# Patient Record
Sex: MALE | Race: WHITE | NOT HISPANIC OR LATINO | Employment: FULL TIME | URBAN - METROPOLITAN AREA
[De-identification: names, ages, dates, MRNs, and addresses within clinical notes are randomized per-mention and may not be internally consistent; named-entity substitution may affect disease eponyms.]

---

## 2019-09-01 ENCOUNTER — OFFICE VISIT (OUTPATIENT)
Dept: URGENT CARE | Facility: CLINIC | Age: 59
End: 2019-09-01
Payer: COMMERCIAL

## 2019-09-01 ENCOUNTER — HOSPITAL ENCOUNTER (INPATIENT)
Facility: MEDICAL CENTER | Age: 59
LOS: 2 days | DRG: 419 | End: 2019-09-03
Attending: EMERGENCY MEDICINE | Admitting: SURGERY
Payer: COMMERCIAL

## 2019-09-01 ENCOUNTER — ANESTHESIA (OUTPATIENT)
Dept: SURGERY | Facility: MEDICAL CENTER | Age: 59
DRG: 419 | End: 2019-09-01
Payer: COMMERCIAL

## 2019-09-01 ENCOUNTER — APPOINTMENT (OUTPATIENT)
Dept: RADIOLOGY | Facility: MEDICAL CENTER | Age: 59
DRG: 419 | End: 2019-09-01
Attending: EMERGENCY MEDICINE
Payer: COMMERCIAL

## 2019-09-01 ENCOUNTER — ANESTHESIA EVENT (OUTPATIENT)
Dept: SURGERY | Facility: MEDICAL CENTER | Age: 59
DRG: 419 | End: 2019-09-01
Payer: COMMERCIAL

## 2019-09-01 VITALS
SYSTOLIC BLOOD PRESSURE: 146 MMHG | BODY MASS INDEX: 37.11 KG/M2 | RESPIRATION RATE: 16 BRPM | HEIGHT: 73 IN | TEMPERATURE: 97.6 F | DIASTOLIC BLOOD PRESSURE: 84 MMHG | OXYGEN SATURATION: 99 % | WEIGHT: 280 LBS | HEART RATE: 74 BPM

## 2019-09-01 DIAGNOSIS — K81.9 CHOLECYSTITIS: ICD-10-CM

## 2019-09-01 DIAGNOSIS — R11.10 VOMITING, INTRACTABILITY OF VOMITING NOT SPECIFIED, PRESENCE OF NAUSEA NOT SPECIFIED, UNSPECIFIED VOMITING TYPE: ICD-10-CM

## 2019-09-01 LAB
ALBUMIN SERPL BCP-MCNC: 4.7 G/DL (ref 3.2–4.9)
ALBUMIN/GLOB SERPL: 1.5 G/DL
ALP SERPL-CCNC: 64 U/L (ref 30–99)
ALT SERPL-CCNC: 12 U/L (ref 2–50)
ANION GAP SERPL CALC-SCNC: 11 MMOL/L (ref 0–11.9)
APPEARANCE UR: CLEAR
AST SERPL-CCNC: 13 U/L (ref 12–45)
BACTERIA #/AREA URNS HPF: NEGATIVE /HPF
BASOPHILS # BLD AUTO: 0.2 % (ref 0–1.8)
BASOPHILS # BLD: 0.03 K/UL (ref 0–0.12)
BILIRUB SERPL-MCNC: 1.1 MG/DL (ref 0.1–1.5)
BILIRUB UR QL STRIP.AUTO: NEGATIVE
BUN SERPL-MCNC: 12 MG/DL (ref 8–22)
CALCIUM SERPL-MCNC: 9.5 MG/DL (ref 8.5–10.5)
CHLORIDE SERPL-SCNC: 103 MMOL/L (ref 96–112)
CO2 SERPL-SCNC: 25 MMOL/L (ref 20–33)
COLOR UR: YELLOW
CREAT SERPL-MCNC: 1.14 MG/DL (ref 0.5–1.4)
EOSINOPHIL # BLD AUTO: 0 K/UL (ref 0–0.51)
EOSINOPHIL NFR BLD: 0 % (ref 0–6.9)
EPI CELLS #/AREA URNS HPF: NEGATIVE /HPF
ERYTHROCYTE [DISTWIDTH] IN BLOOD BY AUTOMATED COUNT: 44.4 FL (ref 35.9–50)
GLOBULIN SER CALC-MCNC: 3.1 G/DL (ref 1.9–3.5)
GLUCOSE SERPL-MCNC: 144 MG/DL (ref 65–99)
GLUCOSE UR STRIP.AUTO-MCNC: NEGATIVE MG/DL
HCT VFR BLD AUTO: 47.8 % (ref 42–52)
HGB BLD-MCNC: 15.9 G/DL (ref 14–18)
HYALINE CASTS #/AREA URNS LPF: ABNORMAL /LPF
IMM GRANULOCYTES # BLD AUTO: 0.06 K/UL (ref 0–0.11)
IMM GRANULOCYTES NFR BLD AUTO: 0.4 % (ref 0–0.9)
KETONES UR STRIP.AUTO-MCNC: 15 MG/DL
LEUKOCYTE ESTERASE UR QL STRIP.AUTO: NEGATIVE
LIPASE SERPL-CCNC: 6 U/L (ref 11–82)
LYMPHOCYTES # BLD AUTO: 0.76 K/UL (ref 1–4.8)
LYMPHOCYTES NFR BLD: 5.6 % (ref 22–41)
MCH RBC QN AUTO: 32.5 PG (ref 27–33)
MCHC RBC AUTO-ENTMCNC: 33.3 G/DL (ref 33.7–35.3)
MCV RBC AUTO: 97.8 FL (ref 81.4–97.8)
MICRO URNS: ABNORMAL
MONOCYTES # BLD AUTO: 1.04 K/UL (ref 0–0.85)
MONOCYTES NFR BLD AUTO: 7.7 % (ref 0–13.4)
MUCOUS THREADS #/AREA URNS HPF: ABNORMAL /HPF
NEUTROPHILS # BLD AUTO: 11.58 K/UL (ref 1.82–7.42)
NEUTROPHILS NFR BLD: 86.1 % (ref 44–72)
NITRITE UR QL STRIP.AUTO: NEGATIVE
NRBC # BLD AUTO: 0 K/UL
NRBC BLD-RTO: 0 /100 WBC
PH UR STRIP.AUTO: 5.5 [PH] (ref 5–8)
PLATELET # BLD AUTO: 133 K/UL (ref 164–446)
PMV BLD AUTO: 10.7 FL (ref 9–12.9)
POTASSIUM SERPL-SCNC: 3.9 MMOL/L (ref 3.6–5.5)
PROT SERPL-MCNC: 7.8 G/DL (ref 6–8.2)
PROT UR QL STRIP: 30 MG/DL
RBC # BLD AUTO: 4.89 M/UL (ref 4.7–6.1)
RBC # URNS HPF: ABNORMAL /HPF
RBC UR QL AUTO: ABNORMAL
SODIUM SERPL-SCNC: 139 MMOL/L (ref 135–145)
SP GR UR STRIP.AUTO: >=1.045
UROBILINOGEN UR STRIP.AUTO-MCNC: 1 MG/DL
WBC # BLD AUTO: 13.5 K/UL (ref 4.8–10.8)
WBC #/AREA URNS HPF: ABNORMAL /HPF

## 2019-09-01 PROCEDURE — A6402 STERILE GAUZE <= 16 SQ IN: HCPCS | Performed by: SURGERY

## 2019-09-01 PROCEDURE — 700111 HCHG RX REV CODE 636 W/ 250 OVERRIDE (IP): Performed by: SURGERY

## 2019-09-01 PROCEDURE — 160002 HCHG RECOVERY MINUTES (STAT): Performed by: SURGERY

## 2019-09-01 PROCEDURE — 501570 HCHG TROCAR, SEPARATOR: Performed by: SURGERY

## 2019-09-01 PROCEDURE — 501577 HCHG TROCAR, STEP 11MM: Performed by: SURGERY

## 2019-09-01 PROCEDURE — 96376 TX/PRO/DX INJ SAME DRUG ADON: CPT

## 2019-09-01 PROCEDURE — A9270 NON-COVERED ITEM OR SERVICE: HCPCS | Performed by: SURGERY

## 2019-09-01 PROCEDURE — 36415 COLL VENOUS BLD VENIPUNCTURE: CPT

## 2019-09-01 PROCEDURE — 700102 HCHG RX REV CODE 250 W/ 637 OVERRIDE(OP): Performed by: SURGERY

## 2019-09-01 PROCEDURE — 83690 ASSAY OF LIPASE: CPT

## 2019-09-01 PROCEDURE — 700111 HCHG RX REV CODE 636 W/ 250 OVERRIDE (IP): Performed by: EMERGENCY MEDICINE

## 2019-09-01 PROCEDURE — 700112 HCHG RX REV CODE 229: Performed by: SURGERY

## 2019-09-01 PROCEDURE — 160036 HCHG PACU - EA ADDL 30 MINS PHASE I: Performed by: SURGERY

## 2019-09-01 PROCEDURE — A9270 NON-COVERED ITEM OR SERVICE: HCPCS | Performed by: ANESTHESIOLOGY

## 2019-09-01 PROCEDURE — 501568 HCHG TROCAR, BLUNTPORT 12MM: Performed by: SURGERY

## 2019-09-01 PROCEDURE — 501838 HCHG SUTURE GENERAL: Performed by: SURGERY

## 2019-09-01 PROCEDURE — 160039 HCHG SURGERY MINUTES - EA ADDL 1 MIN LEVEL 3: Performed by: SURGERY

## 2019-09-01 PROCEDURE — 160048 HCHG OR STATISTICAL LEVEL 1-5: Performed by: SURGERY

## 2019-09-01 PROCEDURE — 99285 EMERGENCY DEPT VISIT HI MDM: CPT

## 2019-09-01 PROCEDURE — 700105 HCHG RX REV CODE 258: Performed by: SURGERY

## 2019-09-01 PROCEDURE — 700117 HCHG RX CONTRAST REV CODE 255: Performed by: EMERGENCY MEDICINE

## 2019-09-01 PROCEDURE — 500445 HCHG HEMOSTAT, SURGICEL 4X8: Performed by: SURGERY

## 2019-09-01 PROCEDURE — 700105 HCHG RX REV CODE 258: Performed by: EMERGENCY MEDICINE

## 2019-09-01 PROCEDURE — 96375 TX/PRO/DX INJ NEW DRUG ADDON: CPT

## 2019-09-01 PROCEDURE — 770006 HCHG ROOM/CARE - MED/SURG/GYN SEMI*

## 2019-09-01 PROCEDURE — 160009 HCHG ANES TIME/MIN: Performed by: SURGERY

## 2019-09-01 PROCEDURE — 700102 HCHG RX REV CODE 250 W/ 637 OVERRIDE(OP): Performed by: ANESTHESIOLOGY

## 2019-09-01 PROCEDURE — 81001 URINALYSIS AUTO W/SCOPE: CPT

## 2019-09-01 PROCEDURE — 700101 HCHG RX REV CODE 250: Performed by: ANESTHESIOLOGY

## 2019-09-01 PROCEDURE — 700105 HCHG RX REV CODE 258: Performed by: ANESTHESIOLOGY

## 2019-09-01 PROCEDURE — 0FT44ZZ RESECTION OF GALLBLADDER, PERCUTANEOUS ENDOSCOPIC APPROACH: ICD-10-PCS | Performed by: SURGERY

## 2019-09-01 PROCEDURE — 160028 HCHG SURGERY MINUTES - 1ST 30 MINS LEVEL 3: Performed by: SURGERY

## 2019-09-01 PROCEDURE — 700111 HCHG RX REV CODE 636 W/ 250 OVERRIDE (IP): Performed by: ANESTHESIOLOGY

## 2019-09-01 PROCEDURE — 501583 HCHG TROCAR, THRD CAN&SEAL 5X100: Performed by: SURGERY

## 2019-09-01 PROCEDURE — 88304 TISSUE EXAM BY PATHOLOGIST: CPT

## 2019-09-01 PROCEDURE — 502648 HCHG APPLICATOR, EVICEL: Performed by: SURGERY

## 2019-09-01 PROCEDURE — 500389 HCHG DRAIN, RESERVOIR SUCT JP 100CC: Performed by: SURGERY

## 2019-09-01 PROCEDURE — 74177 CT ABD & PELVIS W/CONTRAST: CPT

## 2019-09-01 PROCEDURE — 160035 HCHG PACU - 1ST 60 MINS PHASE I: Performed by: SURGERY

## 2019-09-01 PROCEDURE — 501399 HCHG SPECIMAN BAG, ENDO CATC: Performed by: SURGERY

## 2019-09-01 PROCEDURE — 85025 COMPLETE CBC W/AUTO DIFF WBC: CPT

## 2019-09-01 PROCEDURE — 700101 HCHG RX REV CODE 250: Performed by: SURGERY

## 2019-09-01 PROCEDURE — 80053 COMPREHEN METABOLIC PANEL: CPT

## 2019-09-01 PROCEDURE — 502571 HCHG PACK, LAP CHOLE: Performed by: SURGERY

## 2019-09-01 PROCEDURE — 96365 THER/PROPH/DIAG IV INF INIT: CPT

## 2019-09-01 RX ORDER — AMOXICILLIN 250 MG
1 CAPSULE ORAL NIGHTLY
Status: DISCONTINUED | OUTPATIENT
Start: 2019-09-01 | End: 2019-09-03 | Stop reason: HOSPADM

## 2019-09-01 RX ORDER — DIPHENHYDRAMINE HYDROCHLORIDE 50 MG/ML
12.5 INJECTION INTRAMUSCULAR; INTRAVENOUS
Status: DISCONTINUED | OUTPATIENT
Start: 2019-09-01 | End: 2019-09-01 | Stop reason: HOSPADM

## 2019-09-01 RX ORDER — MEPERIDINE HYDROCHLORIDE 25 MG/ML
12.5 INJECTION INTRAMUSCULAR; INTRAVENOUS; SUBCUTANEOUS
Status: DISCONTINUED | OUTPATIENT
Start: 2019-09-01 | End: 2019-09-01 | Stop reason: HOSPADM

## 2019-09-01 RX ORDER — ACETAMINOPHEN 650 MG/1
650 SUPPOSITORY RECTAL EVERY 4 HOURS PRN
Status: DISCONTINUED | OUTPATIENT
Start: 2019-09-01 | End: 2019-09-03 | Stop reason: HOSPADM

## 2019-09-01 RX ORDER — CEFOTETAN DISODIUM 2 G/20ML
INJECTION, POWDER, FOR SOLUTION INTRAMUSCULAR; INTRAVENOUS PRN
Status: DISCONTINUED | OUTPATIENT
Start: 2019-09-01 | End: 2019-09-01 | Stop reason: SURG

## 2019-09-01 RX ORDER — MORPHINE SULFATE 10 MG/ML
6 INJECTION, SOLUTION INTRAMUSCULAR; INTRAVENOUS
Status: DISCONTINUED | OUTPATIENT
Start: 2019-09-01 | End: 2019-09-03 | Stop reason: HOSPADM

## 2019-09-01 RX ORDER — AMOXICILLIN 250 MG
1 CAPSULE ORAL
Status: DISCONTINUED | OUTPATIENT
Start: 2019-09-01 | End: 2019-09-03 | Stop reason: HOSPADM

## 2019-09-01 RX ORDER — BISACODYL 10 MG
10 SUPPOSITORY, RECTAL RECTAL
Status: DISCONTINUED | OUTPATIENT
Start: 2019-09-01 | End: 2019-09-03 | Stop reason: HOSPADM

## 2019-09-01 RX ORDER — HYDROMORPHONE HYDROCHLORIDE 1 MG/ML
0.2 INJECTION, SOLUTION INTRAMUSCULAR; INTRAVENOUS; SUBCUTANEOUS
Status: DISCONTINUED | OUTPATIENT
Start: 2019-09-01 | End: 2019-09-01 | Stop reason: HOSPADM

## 2019-09-01 RX ORDER — MORPHINE SULFATE 4 MG/ML
2 INJECTION, SOLUTION INTRAMUSCULAR; INTRAVENOUS
Status: DISCONTINUED | OUTPATIENT
Start: 2019-09-01 | End: 2019-09-03 | Stop reason: HOSPADM

## 2019-09-01 RX ORDER — ENEMA 19; 7 G/133ML; G/133ML
1 ENEMA RECTAL
Status: DISCONTINUED | OUTPATIENT
Start: 2019-09-01 | End: 2019-09-03 | Stop reason: HOSPADM

## 2019-09-01 RX ORDER — CELECOXIB 200 MG/1
200 CAPSULE ORAL ONCE
Status: COMPLETED | OUTPATIENT
Start: 2019-09-01 | End: 2019-09-01

## 2019-09-01 RX ORDER — SODIUM CHLORIDE 9 MG/ML
1000 INJECTION, SOLUTION INTRAVENOUS ONCE
Status: COMPLETED | OUTPATIENT
Start: 2019-09-01 | End: 2019-09-01

## 2019-09-01 RX ORDER — HYDROMORPHONE HYDROCHLORIDE 1 MG/ML
0.4 INJECTION, SOLUTION INTRAMUSCULAR; INTRAVENOUS; SUBCUTANEOUS
Status: DISCONTINUED | OUTPATIENT
Start: 2019-09-01 | End: 2019-09-01 | Stop reason: HOSPADM

## 2019-09-01 RX ORDER — GABAPENTIN 300 MG/1
300 CAPSULE ORAL ONCE
Status: COMPLETED | OUTPATIENT
Start: 2019-09-01 | End: 2019-09-01

## 2019-09-01 RX ORDER — OXYCODONE HYDROCHLORIDE 5 MG/1
5 TABLET ORAL
Status: DISCONTINUED | OUTPATIENT
Start: 2019-09-01 | End: 2019-09-03 | Stop reason: HOSPADM

## 2019-09-01 RX ORDER — SODIUM CHLORIDE, SODIUM LACTATE, POTASSIUM CHLORIDE, CALCIUM CHLORIDE 600; 310; 30; 20 MG/100ML; MG/100ML; MG/100ML; MG/100ML
INJECTION, SOLUTION INTRAVENOUS CONTINUOUS
Status: DISCONTINUED | OUTPATIENT
Start: 2019-09-01 | End: 2019-09-03 | Stop reason: HOSPADM

## 2019-09-01 RX ORDER — POLYETHYLENE GLYCOL 3350 17 G/17G
1 POWDER, FOR SOLUTION ORAL 2 TIMES DAILY
Status: DISCONTINUED | OUTPATIENT
Start: 2019-09-01 | End: 2019-09-03 | Stop reason: HOSPADM

## 2019-09-01 RX ORDER — OXYCODONE HYDROCHLORIDE 5 MG/1
2.5 TABLET ORAL
Status: DISCONTINUED | OUTPATIENT
Start: 2019-09-01 | End: 2019-09-03 | Stop reason: HOSPADM

## 2019-09-01 RX ORDER — BUPIVACAINE HYDROCHLORIDE AND EPINEPHRINE 5; 5 MG/ML; UG/ML
INJECTION, SOLUTION EPIDURAL; INTRACAUDAL; PERINEURAL
Status: DISCONTINUED | OUTPATIENT
Start: 2019-09-01 | End: 2019-09-01 | Stop reason: HOSPADM

## 2019-09-01 RX ORDER — OXYCODONE HCL 5 MG/5 ML
10 SOLUTION, ORAL ORAL
Status: DISCONTINUED | OUTPATIENT
Start: 2019-09-01 | End: 2019-09-01 | Stop reason: HOSPADM

## 2019-09-01 RX ORDER — SODIUM CHLORIDE, SODIUM LACTATE, POTASSIUM CHLORIDE, CALCIUM CHLORIDE 600; 310; 30; 20 MG/100ML; MG/100ML; MG/100ML; MG/100ML
INJECTION, SOLUTION INTRAVENOUS ONCE
Status: COMPLETED | OUTPATIENT
Start: 2019-09-01 | End: 2019-09-01

## 2019-09-01 RX ORDER — FAMOTIDINE 20 MG/1
20 TABLET, FILM COATED ORAL EVERY MORNING
COMMUNITY

## 2019-09-01 RX ORDER — ONDANSETRON 2 MG/ML
4 INJECTION INTRAMUSCULAR; INTRAVENOUS EVERY 4 HOURS PRN
Status: DISCONTINUED | OUTPATIENT
Start: 2019-09-01 | End: 2019-09-03 | Stop reason: HOSPADM

## 2019-09-01 RX ORDER — ONDANSETRON 2 MG/ML
4 INJECTION INTRAMUSCULAR; INTRAVENOUS
Status: DISCONTINUED | OUTPATIENT
Start: 2019-09-01 | End: 2019-09-01 | Stop reason: HOSPADM

## 2019-09-01 RX ORDER — DEXAMETHASONE SODIUM PHOSPHATE 4 MG/ML
INJECTION, SOLUTION INTRA-ARTICULAR; INTRALESIONAL; INTRAMUSCULAR; INTRAVENOUS; SOFT TISSUE PRN
Status: DISCONTINUED | OUTPATIENT
Start: 2019-09-01 | End: 2019-09-01 | Stop reason: SURG

## 2019-09-01 RX ORDER — DOCUSATE SODIUM 100 MG/1
100 CAPSULE, LIQUID FILLED ORAL 2 TIMES DAILY
Status: DISCONTINUED | OUTPATIENT
Start: 2019-09-01 | End: 2019-09-03 | Stop reason: HOSPADM

## 2019-09-01 RX ORDER — ONDANSETRON 2 MG/ML
4 INJECTION INTRAMUSCULAR; INTRAVENOUS ONCE
Status: COMPLETED | OUTPATIENT
Start: 2019-09-01 | End: 2019-09-01

## 2019-09-01 RX ORDER — TADALAFIL 5 MG/1
5 TABLET ORAL EVERY MORNING
COMMUNITY
Start: 2019-07-07

## 2019-09-01 RX ORDER — FAMOTIDINE 20 MG/1
20 TABLET, FILM COATED ORAL 2 TIMES DAILY
Status: DISCONTINUED | OUTPATIENT
Start: 2019-09-01 | End: 2019-09-03 | Stop reason: HOSPADM

## 2019-09-01 RX ORDER — LABETALOL HYDROCHLORIDE 5 MG/ML
INJECTION, SOLUTION INTRAVENOUS PRN
Status: DISCONTINUED | OUTPATIENT
Start: 2019-09-01 | End: 2019-09-01 | Stop reason: SURG

## 2019-09-01 RX ORDER — HALOPERIDOL 5 MG/ML
1 INJECTION INTRAMUSCULAR
Status: DISCONTINUED | OUTPATIENT
Start: 2019-09-01 | End: 2019-09-01 | Stop reason: HOSPADM

## 2019-09-01 RX ORDER — SODIUM CHLORIDE, SODIUM LACTATE, POTASSIUM CHLORIDE, CALCIUM CHLORIDE 600; 310; 30; 20 MG/100ML; MG/100ML; MG/100ML; MG/100ML
INJECTION, SOLUTION INTRAVENOUS CONTINUOUS
Status: DISCONTINUED | OUTPATIENT
Start: 2019-09-01 | End: 2019-09-01 | Stop reason: HOSPADM

## 2019-09-01 RX ORDER — HYDROMORPHONE HYDROCHLORIDE 1 MG/ML
0.1 INJECTION, SOLUTION INTRAMUSCULAR; INTRAVENOUS; SUBCUTANEOUS
Status: DISCONTINUED | OUTPATIENT
Start: 2019-09-01 | End: 2019-09-01 | Stop reason: HOSPADM

## 2019-09-01 RX ORDER — OXYCODONE HCL 5 MG/5 ML
5 SOLUTION, ORAL ORAL
Status: DISCONTINUED | OUTPATIENT
Start: 2019-09-01 | End: 2019-09-01 | Stop reason: HOSPADM

## 2019-09-01 RX ORDER — ACETAMINOPHEN 325 MG/1
650 TABLET ORAL EVERY 4 HOURS PRN
Status: DISCONTINUED | OUTPATIENT
Start: 2019-09-01 | End: 2019-09-03 | Stop reason: HOSPADM

## 2019-09-01 RX ORDER — ACETAMINOPHEN 500 MG
1000 TABLET ORAL ONCE
Status: COMPLETED | OUTPATIENT
Start: 2019-09-01 | End: 2019-09-01

## 2019-09-01 RX ORDER — HYDROMORPHONE HYDROCHLORIDE 2 MG/ML
INJECTION, SOLUTION INTRAMUSCULAR; INTRAVENOUS; SUBCUTANEOUS PRN
Status: DISCONTINUED | OUTPATIENT
Start: 2019-09-01 | End: 2019-09-01 | Stop reason: SURG

## 2019-09-01 RX ORDER — MORPHINE SULFATE 4 MG/ML
4 INJECTION, SOLUTION INTRAMUSCULAR; INTRAVENOUS ONCE
Status: COMPLETED | OUTPATIENT
Start: 2019-09-01 | End: 2019-09-01

## 2019-09-01 RX ORDER — LABETALOL HYDROCHLORIDE 5 MG/ML
5 INJECTION, SOLUTION INTRAVENOUS
Status: DISCONTINUED | OUTPATIENT
Start: 2019-09-01 | End: 2019-09-01 | Stop reason: HOSPADM

## 2019-09-01 RX ORDER — ACETAMINOPHEN 325 MG/1
650 TABLET ORAL EVERY 6 HOURS
Status: DISCONTINUED | OUTPATIENT
Start: 2019-09-01 | End: 2019-09-03 | Stop reason: HOSPADM

## 2019-09-01 RX ORDER — LIDOCAINE HYDROCHLORIDE 20 MG/ML
INJECTION, SOLUTION EPIDURAL; INFILTRATION; INTRACAUDAL; PERINEURAL PRN
Status: DISCONTINUED | OUTPATIENT
Start: 2019-09-01 | End: 2019-09-01 | Stop reason: SURG

## 2019-09-01 RX ADMIN — PROPOFOL 200 MG: 10 INJECTION, EMULSION INTRAVENOUS at 18:39

## 2019-09-01 RX ADMIN — CEFOTETAN DISODIUM 2 G: 2 INJECTION, POWDER, FOR SOLUTION INTRAMUSCULAR; INTRAVENOUS at 18:36

## 2019-09-01 RX ADMIN — MORPHINE SULFATE 4 MG: 4 INJECTION INTRAVENOUS at 13:05

## 2019-09-01 RX ADMIN — CELECOXIB 200 MG: 200 CAPSULE ORAL at 18:16

## 2019-09-01 RX ADMIN — ONDANSETRON 4 MG: 2 INJECTION INTRAMUSCULAR; INTRAVENOUS at 13:06

## 2019-09-01 RX ADMIN — SODIUM CHLORIDE 1000 ML: 9 INJECTION, SOLUTION INTRAVENOUS at 13:05

## 2019-09-01 RX ADMIN — FENTANYL CITRATE 100 MCG: 50 INJECTION, SOLUTION INTRAMUSCULAR; INTRAVENOUS at 18:39

## 2019-09-01 RX ADMIN — LABETALOL HYDROCHLORIDE 10 MG: 5 INJECTION, SOLUTION INTRAVENOUS at 19:42

## 2019-09-01 RX ADMIN — SODIUM CHLORIDE, POTASSIUM CHLORIDE, SODIUM LACTATE AND CALCIUM CHLORIDE: 600; 310; 30; 20 INJECTION, SOLUTION INTRAVENOUS at 16:28

## 2019-09-01 RX ADMIN — ACETAMINOPHEN 1000 MG: 500 TABLET ORAL at 18:15

## 2019-09-01 RX ADMIN — SUCCINYLCHOLINE CHLORIDE 200 MG: 20 INJECTION, SOLUTION INTRAMUSCULAR; INTRAVENOUS at 18:39

## 2019-09-01 RX ADMIN — PIPERACILLIN AND TAZOBACTAM 4.5 G: 4; .5 INJECTION, POWDER, LYOPHILIZED, FOR SOLUTION INTRAVENOUS; PARENTERAL at 15:39

## 2019-09-01 RX ADMIN — ACETAMINOPHEN 650 MG: 325 TABLET, FILM COATED ORAL at 22:30

## 2019-09-01 RX ADMIN — LIDOCAINE HYDROCHLORIDE 5 ML: 20 INJECTION, SOLUTION EPIDURAL; INFILTRATION; INTRACAUDAL at 18:39

## 2019-09-01 RX ADMIN — FAMOTIDINE 20 MG: 20 TABLET ORAL at 22:31

## 2019-09-01 RX ADMIN — IOHEXOL 100 ML: 350 INJECTION, SOLUTION INTRAVENOUS at 14:09

## 2019-09-01 RX ADMIN — MIDAZOLAM HYDROCHLORIDE 2 MG: 1 INJECTION, SOLUTION INTRAMUSCULAR; INTRAVENOUS at 18:39

## 2019-09-01 RX ADMIN — MORPHINE SULFATE 6 MG: 10 INJECTION INTRAVENOUS at 15:39

## 2019-09-01 RX ADMIN — SODIUM CHLORIDE, POTASSIUM CHLORIDE, SODIUM LACTATE AND CALCIUM CHLORIDE: 600; 310; 30; 20 INJECTION, SOLUTION INTRAVENOUS at 21:10

## 2019-09-01 RX ADMIN — HYDROMORPHONE HYDROCHLORIDE 1 MG: 2 INJECTION, SOLUTION INTRAMUSCULAR; INTRAVENOUS; SUBCUTANEOUS at 19:15

## 2019-09-01 RX ADMIN — ROCURONIUM BROMIDE 50 MG: 10 INJECTION, SOLUTION INTRAVENOUS at 18:39

## 2019-09-01 RX ADMIN — SODIUM CHLORIDE, POTASSIUM CHLORIDE, SODIUM LACTATE AND CALCIUM CHLORIDE: 600; 310; 30; 20 INJECTION, SOLUTION INTRAVENOUS at 22:33

## 2019-09-01 RX ADMIN — SENNOSIDES, DOCUSATE SODIUM 1 TABLET: 50; 8.6 TABLET, FILM COATED ORAL at 22:30

## 2019-09-01 RX ADMIN — DEXAMETHASONE SODIUM PHOSPHATE 4 MG: 4 INJECTION, SOLUTION INTRA-ARTICULAR; INTRALESIONAL; INTRAMUSCULAR; INTRAVENOUS; SOFT TISSUE at 18:39

## 2019-09-01 RX ADMIN — DOCUSATE SODIUM 100 MG: 100 CAPSULE, LIQUID FILLED ORAL at 22:30

## 2019-09-01 RX ADMIN — SUGAMMADEX 200 MG: 100 INJECTION, SOLUTION INTRAVENOUS at 20:36

## 2019-09-01 RX ADMIN — GABAPENTIN 300 MG: 300 CAPSULE ORAL at 18:16

## 2019-09-01 RX ADMIN — ONDANSETRON 4 MG: 2 INJECTION INTRAMUSCULAR; INTRAVENOUS at 18:39

## 2019-09-01 RX ADMIN — FENTANYL CITRATE 150 MCG: 50 INJECTION, SOLUTION INTRAMUSCULAR; INTRAVENOUS at 19:04

## 2019-09-01 RX ADMIN — PIPERACILLIN AND TAZOBACTAM 4.5 G: 4; .5 INJECTION, POWDER, LYOPHILIZED, FOR SOLUTION INTRAVENOUS; PARENTERAL at 22:32

## 2019-09-01 RX ADMIN — HYDROMORPHONE HYDROCHLORIDE 1 MG: 2 INJECTION, SOLUTION INTRAMUSCULAR; INTRAVENOUS; SUBCUTANEOUS at 19:06

## 2019-09-01 RX ADMIN — POLYETHYLENE GLYCOL 3350 1 PACKET: 17 POWDER, FOR SOLUTION ORAL at 22:37

## 2019-09-01 RX ADMIN — SODIUM CHLORIDE, POTASSIUM CHLORIDE, SODIUM LACTATE AND CALCIUM CHLORIDE: 600; 310; 30; 20 INJECTION, SOLUTION INTRAVENOUS at 18:36

## 2019-09-01 ASSESSMENT — LIFESTYLE VARIABLES
CONSUMPTION TOTAL: NEGATIVE
HAVE YOU EVER FELT YOU SHOULD CUT DOWN ON YOUR DRINKING: NO
HOW MANY TIMES IN THE PAST YEAR HAVE YOU HAD 5 OR MORE DRINKS IN A DAY: 0
EVER HAD A DRINK FIRST THING IN THE MORNING TO STEADY YOUR NERVES TO GET RID OF A HANGOVER: NO
EVER_SMOKED: NEVER
TOTAL SCORE: 0
ON A TYPICAL DAY WHEN YOU DRINK ALCOHOL HOW MANY DRINKS DO YOU HAVE: 3
AVERAGE NUMBER OF DAYS PER WEEK YOU HAVE A DRINK CONTAINING ALCOHOL: 2
ALCOHOL_USE: YES
DOES PATIENT WANT TO STOP DRINKING: NO
TOTAL SCORE: 0
HAVE PEOPLE ANNOYED YOU BY CRITICIZING YOUR DRINKING: NO
TOTAL SCORE: 0
EVER FELT BAD OR GUILTY ABOUT YOUR DRINKING: NO

## 2019-09-01 ASSESSMENT — COGNITIVE AND FUNCTIONAL STATUS - GENERAL
DAILY ACTIVITIY SCORE: 24
MOBILITY SCORE: 24
SUGGESTED CMS G CODE MODIFIER MOBILITY: CH
SUGGESTED CMS G CODE MODIFIER DAILY ACTIVITY: CH

## 2019-09-01 ASSESSMENT — PATIENT HEALTH QUESTIONNAIRE - PHQ9
SUM OF ALL RESPONSES TO PHQ9 QUESTIONS 1 AND 2: 0
1. LITTLE INTEREST OR PLEASURE IN DOING THINGS: NOT AT ALL
2. FEELING DOWN, DEPRESSED, IRRITABLE, OR HOPELESS: NOT AT ALL

## 2019-09-01 ASSESSMENT — PAIN SCALES - GENERAL: PAIN_LEVEL: 0

## 2019-09-01 NOTE — PROGRESS NOTES
Patient is a 58-year-old male who presents today with complaint of onset of severe abdominal pain over the last 12 hours.  States he has been unable to eat or drink.  He has attempted to drink water and coffee and states that he is thrown that up.  Reports he did drink alcohol last night, but states he has been having abdominal pain over the last 3 weeks intermittently.  He states this morning it has been the most severe.  States he has pain in his abdomen with certain movement or riding in the car.  States he is uncomfortable sitting down or laying down.  Upon exam, patient's abdomen is soft but tender generally.  Oropharynx is dry with dry oral mucosa.  As I have limited resources on the weekend, I did speak to patient about going to ER for further evaluation and higher level of care.  Patient verbalized understanding, states he is willing to go to ER at this time.  Patient will proceed to [facility] ER for further evaluation.

## 2019-09-01 NOTE — ED TRIAGE NOTES
Ambulates to triage  Chief Complaint   Patient presents with   • Abdominal Pain     comes and goes x2 weeks   • Back Pain   • N/V   • Sent from Urgent Care     Pt was seen at  this morning and was sent here for further eval.  Pt said in the last 2 weeks the pain would come and go, but this morning it woke him from sleep, describes it as a burning pain.  Last BM this morning, denies any blood in his stool.

## 2019-09-01 NOTE — ED PROVIDER NOTES
"ED Provider Note    CHIEF COMPLAINT  Chief Complaint   Patient presents with   • Abdominal Pain     comes and goes x2 weeks   • Back Pain   • N/V   • Sent from Urgent Care       HPI  Kwame Heath is a 58 y.o. male who presents for evaluation of abdominal pain, severe beginning about 12 hours ago associated with vomiting and radiating to the back, sent here from urgent care.  States over the past 2 weeks he has had a few similar episodes albeit only lasting an hour or 2, not associated with vomiting and then spontaneously resolving.  Does not seem to be associated with eating and states it seems as though these episodes occur while he is lying down and sleeping.  He states that he has had no diarrhea no fever, no abdominal surgeries and has never had pain like this in the past.  Says he takes an antacid every day otherwise has no diagnosed medical problems and offers no other specific complaints at this time.    REVIEW OF SYSTEMS  Negative for fever, rash, chest pain, dyspnea, diarrhea, headache, focal weakness, focal numbness, focal tingling. All other systems are negative.     PAST MEDICAL HISTORY  History reviewed. No pertinent past medical history.    FAMILY HISTORY  History reviewed. No pertinent family history.    SOCIAL HISTORY  Social History     Tobacco Use   • Smoking status: Never Smoker   • Smokeless tobacco: Never Used   Substance Use Topics   • Alcohol use: Yes     Comment: occ   • Drug use: Never       SURGICAL HISTORY  History reviewed. No pertinent surgical history.    CURRENT MEDICATIONS  I personally reviewed the medication list in the charting documentation.     ALLERGIES  No Known Allergies    MEDICAL RECORD  I have reviewed patient's medical record and pertinent results are listed above.      PHYSICAL EXAM  VITAL SIGNS: BP (!) 174/94   Pulse 72   Temp 36.5 °C (97.7 °F) (Temporal)   Resp 17   Ht 1.854 m (6' 1\")   Wt (!) 127.2 kg (280 lb 6.8 oz)   SpO2 97%   BMI 37.00 kg/m²  "   Constitutional: Appears to be quite uncomfortable, not toxically ill  HENT: Mucus membranes moist.    Eyes: No scleral icterus. Normal conjunctiva   Neck: Supple, comfortable, nonpainful range of motion.   Cardiovascular: Regular heart rate and rhythm.   Thorax & Lungs: Chest is nontender.  Lungs are clear to auscultation with good air movement bilaterally.  No wheeze, rhonchi, nor rales.   Abdomen: Soft, minimal distention, moderate tenderness in the right upper quadrant and left lower quadrants, no rebound, no guarding.  To a lesser degree the entire abdomen is somewhat tender  Skin: Warm, dry. No rash appreciated  Extremities/Musculoskeletal: No sign of trauma. No asymmetric calf tenderness, erythema or edema. Normal range of motion   Neurologic: Alert & oriented. No focal deficits observed.   Psychiatric: Normal affect appropriate for the clinical situation.    DIAGNOSTIC STUDIES / PROCEDURES    LABS/EKGs  Results for orders placed or performed during the hospital encounter of 09/01/19   COMP METABOLIC PANEL   Result Value Ref Range    Sodium 139 135 - 145 mmol/L    Potassium 3.9 3.6 - 5.5 mmol/L    Chloride 103 96 - 112 mmol/L    Co2 25 20 - 33 mmol/L    Anion Gap 11.0 0.0 - 11.9    Glucose 144 (H) 65 - 99 mg/dL    Bun 12 8 - 22 mg/dL    Creatinine 1.14 0.50 - 1.40 mg/dL    Calcium 9.5 8.5 - 10.5 mg/dL    AST(SGOT) 13 12 - 45 U/L    ALT(SGPT) 12 2 - 50 U/L    Alkaline Phosphatase 64 30 - 99 U/L    Total Bilirubin 1.1 0.1 - 1.5 mg/dL    Albumin 4.7 3.2 - 4.9 g/dL    Total Protein 7.8 6.0 - 8.2 g/dL    Globulin 3.1 1.9 - 3.5 g/dL    A-G Ratio 1.5 g/dL   LIPASE   Result Value Ref Range    Lipase 6 (L) 11 - 82 U/L   URINALYSIS CULTURE, IF INDICATED   Result Value Ref Range    Color Yellow     Character Clear     Specific Gravity >=1.045 (A) <1.035    Ph 5.5 5.0 - 8.0    Glucose Negative Negative mg/dL    Ketones 15 (A) Negative mg/dL    Protein 30 (A) Negative mg/dL    Bilirubin Negative Negative    Urobilinogen,  Urine 1.0 Negative    Nitrite Negative Negative    Leukocyte Esterase Negative Negative    Occult Blood Large (A) Negative    Micro Urine Req Microscopic    CBC WITH DIFFERENTIAL   Result Value Ref Range    WBC 13.5 (H) 4.8 - 10.8 K/uL    RBC 4.89 4.70 - 6.10 M/uL    Hemoglobin 15.9 14.0 - 18.0 g/dL    Hematocrit 47.8 42.0 - 52.0 %    MCV 97.8 81.4 - 97.8 fL    MCH 32.5 27.0 - 33.0 pg    MCHC 33.3 (L) 33.7 - 35.3 g/dL    RDW 44.4 35.9 - 50.0 fL    Platelet Count 133 (L) 164 - 446 K/uL    MPV 10.7 9.0 - 12.9 fL    Neutrophils-Polys 86.10 (H) 44.00 - 72.00 %    Lymphocytes 5.60 (L) 22.00 - 41.00 %    Monocytes 7.70 0.00 - 13.40 %    Eosinophils 0.00 0.00 - 6.90 %    Basophils 0.20 0.00 - 1.80 %    Immature Granulocytes 0.40 0.00 - 0.90 %    Nucleated RBC 0.00 /100 WBC    Neutrophils (Absolute) 11.58 (H) 1.82 - 7.42 K/uL    Lymphs (Absolute) 0.76 (L) 1.00 - 4.80 K/uL    Monos (Absolute) 1.04 (H) 0.00 - 0.85 K/uL    Eos (Absolute) 0.00 0.00 - 0.51 K/uL    Baso (Absolute) 0.03 0.00 - 0.12 K/uL    Immature Granulocytes (abs) 0.06 0.00 - 0.11 K/uL    NRBC (Absolute) 0.00 K/uL   ESTIMATED GFR   Result Value Ref Range    GFR If African American >60 >60 mL/min/1.73 m 2    GFR If Non African American >60 >60 mL/min/1.73 m 2   URINE MICROSCOPIC (W/UA)   Result Value Ref Range    WBC Rare (A) /hpf    RBC 10-20 (A) /hpf    Bacteria Negative None /hpf    Epithelial Cells Negative /hpf    Mucous Threads Moderate /hpf    Hyaline Cast 0-2 /lpf        RADIOLOGY  CT-ABDOMEN-PELVIS WITH   Final Result   1.  Inflammatory change involving the gallbladder which is distended and contains 2-3 cholesterol-containing noncalcified gallstones. Possible acute or subacute cholecystitis.    2.  No biliary dilatation or evidence of associated pancreatitis.    3.  Otherwise negative CT abdomen and pelvis.                      COURSE & MEDICAL DECISION MAKING  I have reviewed any medical record information, laboratory studies and radiographic results  as noted above.  Differential diagnoses includes: Dehydration, electrolyte abnormalities, diverticulitis, cholecystitis, biliary obstruction, hepatitis, pancreatitis    Encounter Summary: This is a 58 y.o. male with generalized abdominal pain with focal tenderness in the right upper and left lower quadrants but no evidence of peritonitis, is abnormal for 12 hours with vomiting, he does appear quite uncomfortable on exam.  Sent here by urgent care, has no medical problems and no surgical history.  Will obtain blood work, urinalysis, CT of the abdomen and pelvis, medicate with IV fluids, Zofran and morphine and reevaluate ------ the BBC elevated, CT scan reveals cholecystitis, I discussed the case with Dr. Nayak, general surgeon on-call, patient admitted to the hospital for further evaluation, treated with Zosyn, LR as well as Zofran and morphine as needed.    HYDRATION: Based on the patient's presentation of Acute Vomiting the patient was given IV fluids. IV Hydration was used because oral hydration was not adequate alone. Upon recheck following hydration, the patient was Improving.        DISPOSITION: Admit in guarded condition      FINAL IMPRESSION  1. Cholecystitis           This dictation was created using voice recognition software. The accuracy of the dictation is limited to the abilities of the software. I expect there may be some errors of grammar and possibly content. The nursing notes were reviewed and certain aspects of this information were incorporated into this note.    Electronically signed by: Issa Dowd, 9/1/2019 12:58 PM

## 2019-09-01 NOTE — PROGRESS NOTES
Surgery    Acute Cholecystitis  Admit to surgery  NPO, IVF, Abx  On sched for 630PM.  Will see in preop

## 2019-09-02 PROBLEM — K81.0 ACUTE CHOLECYSTITIS: Status: ACTIVE | Noted: 2019-09-02

## 2019-09-02 LAB
ALBUMIN SERPL BCP-MCNC: 3.8 G/DL (ref 3.2–4.9)
ALBUMIN/GLOB SERPL: 1.7 G/DL
ALP SERPL-CCNC: 51 U/L (ref 30–99)
ALT SERPL-CCNC: 42 U/L (ref 2–50)
ANION GAP SERPL CALC-SCNC: 5 MMOL/L (ref 0–11.9)
AST SERPL-CCNC: 57 U/L (ref 12–45)
BASOPHILS # BLD AUTO: 0.2 % (ref 0–1.8)
BASOPHILS # BLD: 0.02 K/UL (ref 0–0.12)
BILIRUB SERPL-MCNC: 0.8 MG/DL (ref 0.1–1.5)
BUN SERPL-MCNC: 13 MG/DL (ref 8–22)
CALCIUM SERPL-MCNC: 8.4 MG/DL (ref 8.5–10.5)
CHLORIDE SERPL-SCNC: 105 MMOL/L (ref 96–112)
CO2 SERPL-SCNC: 30 MMOL/L (ref 20–33)
CREAT SERPL-MCNC: 1.34 MG/DL (ref 0.5–1.4)
EOSINOPHIL # BLD AUTO: 0 K/UL (ref 0–0.51)
EOSINOPHIL NFR BLD: 0 % (ref 0–6.9)
ERYTHROCYTE [DISTWIDTH] IN BLOOD BY AUTOMATED COUNT: 48.2 FL (ref 35.9–50)
GLOBULIN SER CALC-MCNC: 2.3 G/DL (ref 1.9–3.5)
GLUCOSE SERPL-MCNC: 155 MG/DL (ref 65–99)
HCT VFR BLD AUTO: 45.8 % (ref 42–52)
HGB BLD-MCNC: 15.1 G/DL (ref 14–18)
IMM GRANULOCYTES # BLD AUTO: 0.04 K/UL (ref 0–0.11)
IMM GRANULOCYTES NFR BLD AUTO: 0.4 % (ref 0–0.9)
LYMPHOCYTES # BLD AUTO: 0.68 K/UL (ref 1–4.8)
LYMPHOCYTES NFR BLD: 6.2 % (ref 22–41)
MAGNESIUM SERPL-MCNC: 1.9 MG/DL (ref 1.5–2.5)
MCH RBC QN AUTO: 33.3 PG (ref 27–33)
MCHC RBC AUTO-ENTMCNC: 33 G/DL (ref 33.7–35.3)
MCV RBC AUTO: 101.1 FL (ref 81.4–97.8)
MONOCYTES # BLD AUTO: 1.21 K/UL (ref 0–0.85)
MONOCYTES NFR BLD AUTO: 11 % (ref 0–13.4)
NEUTROPHILS # BLD AUTO: 9.09 K/UL (ref 1.82–7.42)
NEUTROPHILS NFR BLD: 82.2 % (ref 44–72)
NRBC # BLD AUTO: 0 K/UL
NRBC BLD-RTO: 0 /100 WBC
PHOSPHATE SERPL-MCNC: 3.7 MG/DL (ref 2.5–4.5)
PLATELET # BLD AUTO: 127 K/UL (ref 164–446)
PMV BLD AUTO: 11 FL (ref 9–12.9)
POTASSIUM SERPL-SCNC: 5 MMOL/L (ref 3.6–5.5)
PROT SERPL-MCNC: 6.1 G/DL (ref 6–8.2)
RBC # BLD AUTO: 4.53 M/UL (ref 4.7–6.1)
SODIUM SERPL-SCNC: 140 MMOL/L (ref 135–145)
WBC # BLD AUTO: 11 K/UL (ref 4.8–10.8)

## 2019-09-02 PROCEDURE — 36415 COLL VENOUS BLD VENIPUNCTURE: CPT

## 2019-09-02 PROCEDURE — 85025 COMPLETE CBC W/AUTO DIFF WBC: CPT

## 2019-09-02 PROCEDURE — 700111 HCHG RX REV CODE 636 W/ 250 OVERRIDE (IP): Performed by: SURGERY

## 2019-09-02 PROCEDURE — 700112 HCHG RX REV CODE 229: Performed by: SURGERY

## 2019-09-02 PROCEDURE — 80053 COMPREHEN METABOLIC PANEL: CPT

## 2019-09-02 PROCEDURE — A9270 NON-COVERED ITEM OR SERVICE: HCPCS | Performed by: SURGERY

## 2019-09-02 PROCEDURE — 700102 HCHG RX REV CODE 250 W/ 637 OVERRIDE(OP): Performed by: SURGERY

## 2019-09-02 PROCEDURE — 84100 ASSAY OF PHOSPHORUS: CPT

## 2019-09-02 PROCEDURE — 770006 HCHG ROOM/CARE - MED/SURG/GYN SEMI*

## 2019-09-02 PROCEDURE — 700105 HCHG RX REV CODE 258: Performed by: SURGERY

## 2019-09-02 PROCEDURE — 83735 ASSAY OF MAGNESIUM: CPT

## 2019-09-02 RX ADMIN — OXYCODONE HYDROCHLORIDE 5 MG: 5 TABLET ORAL at 22:30

## 2019-09-02 RX ADMIN — PIPERACILLIN AND TAZOBACTAM 4.5 G: 4; .5 INJECTION, POWDER, LYOPHILIZED, FOR SOLUTION INTRAVENOUS; PARENTERAL at 22:25

## 2019-09-02 RX ADMIN — SODIUM CHLORIDE, POTASSIUM CHLORIDE, SODIUM LACTATE AND CALCIUM CHLORIDE: 600; 310; 30; 20 INJECTION, SOLUTION INTRAVENOUS at 08:57

## 2019-09-02 RX ADMIN — DOCUSATE SODIUM 100 MG: 100 CAPSULE, LIQUID FILLED ORAL at 18:13

## 2019-09-02 RX ADMIN — MAGNESIUM HYDROXIDE 30 ML: 400 SUSPENSION ORAL at 04:14

## 2019-09-02 RX ADMIN — FAMOTIDINE 20 MG: 20 TABLET ORAL at 18:13

## 2019-09-02 RX ADMIN — DOCUSATE SODIUM 100 MG: 100 CAPSULE, LIQUID FILLED ORAL at 04:13

## 2019-09-02 RX ADMIN — PIPERACILLIN AND TAZOBACTAM 4.5 G: 4; .5 INJECTION, POWDER, LYOPHILIZED, FOR SOLUTION INTRAVENOUS; PARENTERAL at 04:50

## 2019-09-02 RX ADMIN — ACETAMINOPHEN 650 MG: 325 TABLET, FILM COATED ORAL at 18:13

## 2019-09-02 RX ADMIN — ENOXAPARIN SODIUM 40 MG: 100 INJECTION SUBCUTANEOUS at 04:14

## 2019-09-02 RX ADMIN — FAMOTIDINE 20 MG: 20 TABLET ORAL at 04:14

## 2019-09-02 RX ADMIN — ACETAMINOPHEN 650 MG: 325 TABLET, FILM COATED ORAL at 12:43

## 2019-09-02 RX ADMIN — PIPERACILLIN AND TAZOBACTAM 4.5 G: 4; .5 INJECTION, POWDER, LYOPHILIZED, FOR SOLUTION INTRAVENOUS; PARENTERAL at 12:42

## 2019-09-02 RX ADMIN — SODIUM CHLORIDE, POTASSIUM CHLORIDE, SODIUM LACTATE AND CALCIUM CHLORIDE: 600; 310; 30; 20 INJECTION, SOLUTION INTRAVENOUS at 19:59

## 2019-09-02 RX ADMIN — ACETAMINOPHEN 650 MG: 325 TABLET, FILM COATED ORAL at 04:13

## 2019-09-02 RX ADMIN — POLYETHYLENE GLYCOL 3350 1 PACKET: 17 POWDER, FOR SOLUTION ORAL at 04:13

## 2019-09-02 ASSESSMENT — COPD QUESTIONNAIRES
DURING THE PAST 4 WEEKS HOW MUCH DID YOU FEEL SHORT OF BREATH: NONE/LITTLE OF THE TIME
COPD SCREENING SCORE: 2
HAVE YOU SMOKED AT LEAST 100 CIGARETTES IN YOUR ENTIRE LIFE: NO/DON'T KNOW
DO YOU EVER COUGH UP ANY MUCUS OR PHLEGM?: NO/ONLY WITH OCCASIONAL COLDS OR INFECTIONS

## 2019-09-02 ASSESSMENT — ENCOUNTER SYMPTOMS
VOMITING: 0
NAUSEA: 0
ABDOMINAL PAIN: 1
CHILLS: 0
SHORTNESS OF BREATH: 0
FEVER: 0

## 2019-09-02 ASSESSMENT — LIFESTYLE VARIABLES: EVER_SMOKED: NEVER

## 2019-09-02 ASSESSMENT — PATIENT HEALTH QUESTIONNAIRE - PHQ9
1. LITTLE INTEREST OR PLEASURE IN DOING THINGS: NOT AT ALL
SUM OF ALL RESPONSES TO PHQ9 QUESTIONS 1 AND 2: 0
2. FEELING DOWN, DEPRESSED, IRRITABLE, OR HOPELESS: NOT AT ALL

## 2019-09-02 NOTE — OP REPORT
Operative report    PreOp Diagnosis: Acute cholecystitis gallstones    PostOp Diagnosis: Same    Procedure(s):  CHOLECYSTECTOMY, LAPAROSCOPIC - Wound Class: Dirty, infected    Surgeon(s):  Jeremiah Nayak D.O.    Anesthesiologist/Type of Anesthesia:  Anesthesiologist: Renan Whaley D.O./General    Surgical Staff:  Monitoring Nurse: Lopez Sepulveda R.N.  Relief Scrub: Delphine Smyth  Scrub Person: Lakesha Hope    Specimens removed if any:  ID Type Source Tests Collected by Time Destination   A : gallbladder Other Other PATHOLOGY SPECIMEN Jeremiah Nayak D.O. 9/1/2019  7:23 PM        Estimated Blood Loss: 50 cc    Findings: Very enlarged and thickened gallbladder with areas of gangrene and fibrinous exudate.  Gallbladder was impacted with stones.  Significant pericholecystic edema and greater than 50% gallbladder was intrahepatic    Complications: None noted    Indications: 58-year-old male with abdominal pain, nausea and vomiting, leukocytosis and imaging consistent with acute cholecystitis.    OPERATIVE REPORT: The patient was brought to the operating room and placed supine on the operating table.  He was placed under general anesthesia and intubated by the anesthesiologist.  The abdomen was then shaved, prepped and draped in standard fashion.  We infiltrated the area under the umbilicus with quarter percent Marcaine with epinephrine for local anesthetic.  A curvilinear incision was made and we dissected down to the fascia using clamp and S-shaped retractors.  The fascia was incised using the Bovie cautery at the base of the umbilicus and we entered at the peritoneum with a clamp.  We placed a stay suture of 0 Vicryl and then inserted the Davila port.  The abdomen was insufflated we surveyed the area.  The gallbladder could not be seen at the margin of the liver and seems somewhat intrahepatic.  Which shows an area in the epigastrium for a 10 mm port.  The skin and subcutaneous tissues demonstrated 1/4%  Marcaine with epinephrine for local anesthetic incision was made.  The port was then inserted under direct camera observation.  Two 5 mm ports were then placed in the right upper quadrant using identical technique.  We are able to pull some omentum down away from the gallbladder and a appeared very thick and edematous.  We had difficulty grasping it off to retract superiorly resell proximally 120 cc of dark thick bile was aspirated.  We then grasped the gallbladder retracted anteriorly and superiorly.  There was a large amount of inflammatory rind around the gallbladder and it took a fair amount of time to dissected away down to the fundus and infundibulum.  We were unable to identify any obvious portal structures during the course at its dissection so we had to take great care to ensure we stayed on the gallbladder wall.  There was a fair amount of oozing from the tissues due to the amount of inflammation and we used Bovie cautery to control this during the dissection.  We used a combination of [place] dissectors, cautery and Kitner dissection to slowly peel the right away from the gallbladder down to the infundibulum.  This took more than 1 hour.  The cystic duct was ultimately identified and skeletonized.  We had more trouble finding the cystic artery but ultimately the vascular pedicle to the gallbladder was clearly identified with a vascular appearing structure entering the gallbladder and the appropriate location.  The artery was doubly clipped proximally and singly clipped distally prior to dividing of this year.  The cystic duct was then triply clipped distally and singly clipped proximally prior to dividing of this year.  On the gallbladder was then retracted laterally and anteriorly and we are able to carefully dissected free from its fossa using the hook cautery.  Again, the gallbladder was significantly intrahepatic and we had to take great care to avoid unnecessary dissection into the liver bed.   the  Bovie cautery was used for hemostasis during the course of this dissection.  Ultimately gallbladder was removed and placed in an Endo Catch bag.  We then examined the gallbladder fossa and several areas of oozing were cauterized.  We fully irrigated the area until the effluent was clear.  The area seemed dry but Surgi-Valentin was placed in the fossa and down over the inflamed rind to try to provide some a secondary hemostasis.  A 10 mm flat fluted Abilio-Overton drain was brought in from the inferior right upper quadrant port site and placed under the the liver in the surgical field.  The drain was secured to the skin using 2-0 nylon stitch.  The abdomen was deflated and the ports were removed.  The gallbladder was retrieved from the umbilical port site.  We used the stay suture that had been placed at the beginning the case to close the fascia at the umbilical port site.  The wounds were all irrigated and the skin at its site was then closed using 4-0 Monocryl stitch in a subcuticular fashion.  The abdomen is then cleaned and dried.  Benzoin, Steri-Strips and dry sterile dressings were applied.  The patient was then awakened from anesthesia and taken the postanesthesia care unit in stable condition.  Sponge, needle and instrument counts are correct at the end of the case.      9/1/2019 8:58 PM Jeremiah Nayak D.O.

## 2019-09-02 NOTE — ANESTHESIA TIME REPORT
Anesthesia Start and Stop Event Times     Date Time Event    9/1/2019 1759 Ready for Procedure     1836 Anesthesia Start     2054 Anesthesia Stop        Responsible Staff  09/01/19    Name Role Begin End    Renan Whaley D.O. Anesth 1836 2054        Preop Diagnosis (Free Text):  Pre-op Diagnosis     Cholecystitis        Preop Diagnosis (Codes):  Diagnosis Information     Diagnosis Code(s): Cholecystitis [K81.9]        Post op Diagnosis  Acute cholecystitis  RUQ pain    Premium Reason  E. Weekend    Comments:

## 2019-09-02 NOTE — ANESTHESIA POSTPROCEDURE EVALUATION
Patient: Kwame Heath    Procedure Summary     Date:  09/01/19 Room / Location:  [place] [address] / SURGERY [facility]    Anesthesia Start:  1836 Anesthesia Stop:  2054    Procedure:  CHOLECYSTECTOMY, LAPAROSCOPIC (N/A Abdomen) Diagnosis:       Cholecystitis      (Cholecystitis)    Surgeon:  Jeremiah Nayak D.O. Responsible Provider:  Renan Whaley D.O.    Anesthesia Type:  general ASA Status:  2 - Emergent          Final Anesthesia Type: general  Last vitals  BP   Blood Pressure: 113/87    Temp   36.8 °C (98.2 °F)    Pulse   Pulse: 85, Heart Rate (Monitored): 76   Resp   12    SpO2   95 %      Anesthesia Post Evaluation    Patient location during evaluation: bedside  Patient participation: waiting for patient participation  Level of consciousness: obtunded/minimal responses  Pain score: 0    Airway patency: patent  Anesthetic complications: no  Cardiovascular status: adequate  Respiratory status: acceptable  Hydration status: acceptable    PONV: none           Nurse Pain Score: 4 (NPRS)

## 2019-09-02 NOTE — PROGRESS NOTES
Received pt from ER.  A&Ox4, VSS on RA.  CHG wipes complete.      2 RN skin check complete.  Some areas of brown discoloration to BLE.  All skin clean, dry, and intact.      Transport taking pt to surgery.  Sent with glasses for consents.

## 2019-09-02 NOTE — ANESTHESIA PROCEDURE NOTES
Airway  Date/Time: 9/1/2019 6:41 PM  Performed by: Renan Whaley D.O.  Authorized by: Renan Whaley D.O.     Location:  OR  Urgency:  Elective  Difficult Airway: No    Indications for Airway Management:  Anesthesia  Spontaneous Ventilation: absent    Sedation Level:  Deep  Preoxygenated: Yes    Patient Position:  Sniffing  Final Airway Type:  Endotracheal airway  Final Endotracheal Airway:  ETT  Cuffed: Yes    Technique Used for Successful ETT Placement:  Direct laryngoscopy  Insertion Site:  Oral  Blade Type:  Amanda  Laryngoscope Blade/Videolaryngoscope Blade Size:  3  ETT Size (mm):  8.0  Measured from:  Teeth  ETT to Teeth (cm):  23  Placement Verified by: auscultation and capnometry    Cormack-Lehane Classification:  Grade IIa - partial view of glottis  Number of Attempts at Approach:  1

## 2019-09-02 NOTE — PROGRESS NOTES
Report received from day RN, assumed Care. On 1L of O2 NC.    Pain controlled at this time.  Patient is tolerating clear liquid diet, denies nausea/vomiting. - flatus (last BM (8/31), + void.  SBA for ambulation  IS max pull 1250    X2 lap sites, dressings CDI  X1 RLQ KAREN  RLQ incision under steristrips near KAREN, well approximated.    Plan of care discussed, all questions answered.    Explained importance of calling before getting OOB and pt verbalizes understanding.       Call light and belongings within reach, treaded slipper socks on, bed in lowest locked position.  Hourly rounding in place, all needs met at this time
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
## 2019-09-02 NOTE — PROGRESS NOTES
2 RN skin check:    x2 lap sites to abdomen  RLQ KAREN, dressing CDI  RLQ incision near KAREN with steristrips, well approximated  BLE duskiness  No other remarkable skin findings.

## 2019-09-02 NOTE — PROGRESS NOTES
Report received from PACU RN.  Patient arrived to floor via gurney. SBA for ambulation.    Educated on admission including: unit policies, welcome packet, white board, TV system, call light, call before fall, plan of care, how to report/escalate concerns, VS schedule, IS use, lab schedule, oral care expectations, ambulation expectations, and up to chair for all meals expectation if possible.    Call light and belongings within reach.  All questions answered.

## 2019-09-02 NOTE — ANESTHESIA PREPROCEDURE EVALUATION
Relevant Problems   ANESTHESIA (within normal limits)      PULMONARY (within normal limits)      NEURO (within normal limits)      CARDIAC (within normal limits)      GI (within normal limits)       (within normal limits)      ENDO (within normal limits)       Physical Exam    Airway   Mallampati: II  TM distance: >3 FB  Neck ROM: full       Cardiovascular - normal exam  Rhythm: regular  Rate: normal  (-) murmur     Dental - normal exam         Pulmonary - normal exam  Breath sounds clear to auscultation     Abdominal   (+) obese     Neurological - normal exam                 Anesthesia Plan    ASA 2- EMERGENT   ASA physical status emergent criteria: acute deteriorating condition due to infection    Plan - general       Airway plan will be ETT        Induction: intravenous      Pertinent diagnostic labs and testing reviewed    Informed Consent:    Anesthetic plan and risks discussed with patient.    Use of blood products discussed with: patient whom.

## 2019-09-02 NOTE — ANESTHESIA QCDR
2019 [facility] Clinical Data Registry (for Quality Improvement)     Postoperative nausea/vomiting risk protocol (Adult = 18 yrs and Pediatric 3-17 yrs)- (430 and 463)  General inhalation anesthetic (NOT TIVA) with PONV risk factors: Yes  Provision of anti-emetic therapy with at least 2 different classes of agents: Yes   Patient DID NOT receive anti-emetic therapy and reason is documented in Medical Record:  N/A    Multimodal Pain Management- (AQI59)  Patient undergoing Elective Surgery (i.e. Outpatient, or ASC, or Prescheduled Surgery prior to Hospital Admission): No  Use of Multimodal Pain Management, two or more drugs and/or interventions, NOT including systemic opioids: N/A  Exception: Documented allergy to multiple classes of analgesics: N/A    PACU assessment of acute postoperative pain prior to Anesthesia Care End- Applies to Patients Age = 18- (ABG7)  Initial PACU pain score is which of the following: Pain not assessed  Patient unable to report pain score: Yes (Patient Unable to Report)    Post-anesthetic transfer of care checklist/protocol to PACU/ICU- (426 and 427)  Upon conclusion of case, patient transferred to which of the following locations: PACU/Non-ICU  Use of transfer checklist/protocol: Yes  Exclusion: Service Performed in Patient Hospital Room (and thus did not require transfer): N/A    PACU Reintubation- (AQI31)  General anesthesia requiring endotracheal intubation (ETT) along with subsequent extubation in OR or PACU: Yes  Required reintubation in the PACU: No   Extubation was a planned trial documented in the medical record prior to removal of the original airway device:  N/A    Unplanned admission to ICU related to anesthesia service up through end of PACU care- (MD51)  Unplanned admission to ICU (not initially anticipated at anesthesia start time): No

## 2019-09-02 NOTE — PROGRESS NOTES
Trauma / Surgical Daily Progress Note    Date of Service  9/2/2019    Chief Complaint  58 y.o. male admitted 9/1/2019 with Cholecystitis, acute    Interval Events    Post day # 1 Laparoscopic cholecystectomy.  Antibiotic therapy, yes     - Advance diet as tolerated   - Disposition: home when medically cleared  - Counseled      Review of Systems  Review of Systems   Constitutional: Negative for chills and fever.   Respiratory: Negative for shortness of breath.    Cardiovascular: Negative for chest pain.   Gastrointestinal: Positive for abdominal pain (Incisional). Negative for nausea and vomiting.        Vital Signs  Temp:  [36.1 °C (97 °F)-36.9 °C (98.4 °F)] 36.6 °C (97.9 °F)  Pulse:  [] 69  Resp:  [10-21] 18  BP: (105-190)/(65-94) 115/73  SpO2:  [93 %-99 %] 99 %    Physical Exam  Physical Exam   Constitutional: No distress.   HENT:   Head: Normocephalic.   Eyes: Conjunctivae are normal.   Neck: No JVD present.   Cardiovascular: Normal rate.   Pulmonary/Chest: He exhibits no tenderness.   Abdominal: There is no rebound and no guarding.   Obese  Lap sites approximated    Musculoskeletal: Normal range of motion.   Neurological: He is alert.   Skin: Skin is warm and dry.   Nursing note and vitals reviewed.      Laboratory  Recent Results (from the past 24 hour(s))   COMP METABOLIC PANEL    Collection Time: 09/01/19 12:58 PM   Result Value Ref Range    Sodium 139 135 - 145 mmol/L    Potassium 3.9 3.6 - 5.5 mmol/L    Chloride 103 96 - 112 mmol/L    Co2 25 20 - 33 mmol/L    Anion Gap 11.0 0.0 - 11.9    Glucose 144 (H) 65 - 99 mg/dL    Bun 12 8 - 22 mg/dL    Creatinine 1.14 0.50 - 1.40 mg/dL    Calcium 9.5 8.5 - 10.5 mg/dL    AST(SGOT) 13 12 - 45 U/L    ALT(SGPT) 12 2 - 50 U/L    Alkaline Phosphatase 64 30 - 99 U/L    Total Bilirubin 1.1 0.1 - 1.5 mg/dL    Albumin 4.7 3.2 - 4.9 g/dL    Total Protein 7.8 6.0 - 8.2 g/dL    Globulin 3.1 1.9 - 3.5 g/dL    A-G Ratio 1.5 g/dL   LIPASE    Collection Time: 09/01/19 12:58 PM    Result Value Ref Range    Lipase 6 (L) 11 - 82 U/L   ESTIMATED GFR    Collection Time: 09/01/19 12:58 PM   Result Value Ref Range    GFR If African American >60 >60 mL/min/1.73 m 2    GFR If Non African American >60 >60 mL/min/1.73 m 2   CBC WITH DIFFERENTIAL    Collection Time: 09/01/19  1:55 PM   Result Value Ref Range    WBC 13.5 (H) 4.8 - 10.8 K/uL    RBC 4.89 4.70 - 6.10 M/uL    Hemoglobin 15.9 14.0 - 18.0 g/dL    Hematocrit 47.8 42.0 - 52.0 %    MCV 97.8 81.4 - 97.8 fL    MCH 32.5 27.0 - 33.0 pg    MCHC 33.3 (L) 33.7 - 35.3 g/dL    RDW 44.4 35.9 - 50.0 fL    Platelet Count 133 (L) 164 - 446 K/uL    MPV 10.7 9.0 - 12.9 fL    Neutrophils-Polys 86.10 (H) 44.00 - 72.00 %    Lymphocytes 5.60 (L) 22.00 - 41.00 %    Monocytes 7.70 0.00 - 13.40 %    Eosinophils 0.00 0.00 - 6.90 %    Basophils 0.20 0.00 - 1.80 %    Immature Granulocytes 0.40 0.00 - 0.90 %    Nucleated RBC 0.00 /100 WBC    Neutrophils (Absolute) 11.58 (H) 1.82 - 7.42 K/uL    Lymphs (Absolute) 0.76 (L) 1.00 - 4.80 K/uL    Monos (Absolute) 1.04 (H) 0.00 - 0.85 K/uL    Eos (Absolute) 0.00 0.00 - 0.51 K/uL    Baso (Absolute) 0.03 0.00 - 0.12 K/uL    Immature Granulocytes (abs) 0.06 0.00 - 0.11 K/uL    NRBC (Absolute) 0.00 K/uL   URINALYSIS CULTURE, IF INDICATED    Collection Time: 09/01/19  2:45 PM   Result Value Ref Range    Color Yellow     Character Clear     Specific Gravity >=1.045 (A) <1.035    Ph 5.5 5.0 - 8.0    Glucose Negative Negative mg/dL    Ketones 15 (A) Negative mg/dL    Protein 30 (A) Negative mg/dL    Bilirubin Negative Negative    Urobilinogen, Urine 1.0 Negative    Nitrite Negative Negative    Leukocyte Esterase Negative Negative    Occult Blood Large (A) Negative    Micro Urine Req Microscopic    URINE MICROSCOPIC (W/UA)    Collection Time: 09/01/19  2:45 PM   Result Value Ref Range    WBC Rare (A) /hpf    RBC 10-20 (A) /hpf    Bacteria Negative None /hpf    Epithelial Cells Negative /hpf    Mucous Threads Moderate /hpf    Hyaline  Cast 0-2 /lpf   CBC with Differential: Tomorrow AM    Collection Time: 09/02/19  3:05 AM   Result Value Ref Range    WBC 11.0 (H) 4.8 - 10.8 K/uL    RBC 4.53 (L) 4.70 - 6.10 M/uL    Hemoglobin 15.1 14.0 - 18.0 g/dL    Hematocrit 45.8 42.0 - 52.0 %    .1 (H) 81.4 - 97.8 fL    MCH 33.3 (H) 27.0 - 33.0 pg    MCHC 33.0 (L) 33.7 - 35.3 g/dL    RDW 48.2 35.9 - 50.0 fL    Platelet Count 127 (L) 164 - 446 K/uL    MPV 11.0 9.0 - 12.9 fL    Neutrophils-Polys 82.20 (H) 44.00 - 72.00 %    Lymphocytes 6.20 (L) 22.00 - 41.00 %    Monocytes 11.00 0.00 - 13.40 %    Eosinophils 0.00 0.00 - 6.90 %    Basophils 0.20 0.00 - 1.80 %    Immature Granulocytes 0.40 0.00 - 0.90 %    Nucleated RBC 0.00 /100 WBC    Neutrophils (Absolute) 9.09 (H) 1.82 - 7.42 K/uL    Lymphs (Absolute) 0.68 (L) 1.00 - 4.80 K/uL    Monos (Absolute) 1.21 (H) 0.00 - 0.85 K/uL    Eos (Absolute) 0.00 0.00 - 0.51 K/uL    Baso (Absolute) 0.02 0.00 - 0.12 K/uL    Immature Granulocytes (abs) 0.04 0.00 - 0.11 K/uL    NRBC (Absolute) 0.00 K/uL   Comp Metabolic Panel (CMP): Tomorrow AM    Collection Time: 09/02/19  3:05 AM   Result Value Ref Range    Sodium 140 135 - 145 mmol/L    Potassium 5.0 3.6 - 5.5 mmol/L    Chloride 105 96 - 112 mmol/L    Co2 30 20 - 33 mmol/L    Anion Gap 5.0 0.0 - 11.9    Glucose 155 (H) 65 - 99 mg/dL    Bun 13 8 - 22 mg/dL    Creatinine 1.34 0.50 - 1.40 mg/dL    Calcium 8.4 (L) 8.5 - 10.5 mg/dL    AST(SGOT) 57 (H) 12 - 45 U/L    ALT(SGPT) 42 2 - 50 U/L    Alkaline Phosphatase 51 30 - 99 U/L    Total Bilirubin 0.8 0.1 - 1.5 mg/dL    Albumin 3.8 3.2 - 4.9 g/dL    Total Protein 6.1 6.0 - 8.2 g/dL    Globulin 2.3 1.9 - 3.5 g/dL    A-G Ratio 1.7 g/dL   Magnesium: Every Monday and Thursday AM    Collection Time: 09/02/19  3:05 AM   Result Value Ref Range    Magnesium 1.9 1.5 - 2.5 mg/dL   Phosphorus: Every Monday and Thursday AM    Collection Time: 09/02/19  3:05 AM   Result Value Ref Range    Phosphorus 3.7 2.5 - 4.5 mg/dL   ESTIMATED GFR     Collection Time: 09/02/19  3:05 AM   Result Value Ref Range    GFR If African American >60 >60 mL/min/1.73 m 2    GFR If Non African American 54 (A) >60 mL/min/1.73 m 2       Fluids    Intake/Output Summary (Last 24 hours) at 9/2/2019 1009  Last data filed at 9/2/2019 0850  Gross per 24 hour   Intake 5266.67 ml   Output 1025 ml   Net 4241.67 ml       Core Measures & Quality Metrics    Topete catheter: No Topete      DVT Prophylaxis: Enoxaparin (Lovenox)    Ulcer prophylaxis: Yes  Antibiotics: Treating active infection/contamination beyond 24 hours perioperative coverage  Assessed for rehab: Patient returned to prior level of function, rehabilitation not indicated at this time    ANDI Score  ETOH Screening    Assessment/Plan  Acute cholecystitis  Assessment & Plan  CT imaging: possible acute or subacute cholecystitis.  9/1 Laparoscopic cholecystectomy.         Discussed patient condition with Patient and trauma surgery, Jeremiah Stout.

## 2019-09-03 VITALS
WEIGHT: 280.43 LBS | SYSTOLIC BLOOD PRESSURE: 133 MMHG | DIASTOLIC BLOOD PRESSURE: 85 MMHG | TEMPERATURE: 98 F | BODY MASS INDEX: 37.17 KG/M2 | RESPIRATION RATE: 16 BRPM | HEART RATE: 90 BPM | OXYGEN SATURATION: 95 % | HEIGHT: 73 IN

## 2019-09-03 LAB
ANION GAP SERPL CALC-SCNC: 5 MMOL/L (ref 0–11.9)
BASOPHILS # BLD AUTO: 0.3 % (ref 0–1.8)
BASOPHILS # BLD: 0.02 K/UL (ref 0–0.12)
BUN SERPL-MCNC: 11 MG/DL (ref 8–22)
CALCIUM SERPL-MCNC: 8.3 MG/DL (ref 8.5–10.5)
CHLORIDE SERPL-SCNC: 107 MMOL/L (ref 96–112)
CO2 SERPL-SCNC: 29 MMOL/L (ref 20–33)
CREAT SERPL-MCNC: 1.18 MG/DL (ref 0.5–1.4)
EOSINOPHIL # BLD AUTO: 0.08 K/UL (ref 0–0.51)
EOSINOPHIL NFR BLD: 1.1 % (ref 0–6.9)
ERYTHROCYTE [DISTWIDTH] IN BLOOD BY AUTOMATED COUNT: 47.8 FL (ref 35.9–50)
GLUCOSE SERPL-MCNC: 107 MG/DL (ref 65–99)
HCT VFR BLD AUTO: 41.1 % (ref 42–52)
HGB BLD-MCNC: 13.5 G/DL (ref 14–18)
IMM GRANULOCYTES # BLD AUTO: 0.02 K/UL (ref 0–0.11)
IMM GRANULOCYTES NFR BLD AUTO: 0.3 % (ref 0–0.9)
LYMPHOCYTES # BLD AUTO: 1.29 K/UL (ref 1–4.8)
LYMPHOCYTES NFR BLD: 17.6 % (ref 22–41)
MCH RBC QN AUTO: 33.2 PG (ref 27–33)
MCHC RBC AUTO-ENTMCNC: 32.8 G/DL (ref 33.7–35.3)
MCV RBC AUTO: 101 FL (ref 81.4–97.8)
MONOCYTES # BLD AUTO: 0.96 K/UL (ref 0–0.85)
MONOCYTES NFR BLD AUTO: 13.1 % (ref 0–13.4)
NEUTROPHILS # BLD AUTO: 4.96 K/UL (ref 1.82–7.42)
NEUTROPHILS NFR BLD: 67.6 % (ref 44–72)
NRBC # BLD AUTO: 0 K/UL
NRBC BLD-RTO: 0 /100 WBC
PATHOLOGY CONSULT NOTE: NORMAL
PLATELET # BLD AUTO: 104 K/UL (ref 164–446)
PMV BLD AUTO: 10.9 FL (ref 9–12.9)
POTASSIUM SERPL-SCNC: 4.3 MMOL/L (ref 3.6–5.5)
RBC # BLD AUTO: 4.07 M/UL (ref 4.7–6.1)
SODIUM SERPL-SCNC: 141 MMOL/L (ref 135–145)
WBC # BLD AUTO: 7.3 K/UL (ref 4.8–10.8)

## 2019-09-03 PROCEDURE — 36415 COLL VENOUS BLD VENIPUNCTURE: CPT

## 2019-09-03 PROCEDURE — 80048 BASIC METABOLIC PNL TOTAL CA: CPT

## 2019-09-03 PROCEDURE — A9270 NON-COVERED ITEM OR SERVICE: HCPCS | Performed by: SURGERY

## 2019-09-03 PROCEDURE — 700102 HCHG RX REV CODE 250 W/ 637 OVERRIDE(OP): Performed by: SURGERY

## 2019-09-03 PROCEDURE — 700105 HCHG RX REV CODE 258: Performed by: SURGERY

## 2019-09-03 PROCEDURE — 85025 COMPLETE CBC W/AUTO DIFF WBC: CPT

## 2019-09-03 PROCEDURE — 700111 HCHG RX REV CODE 636 W/ 250 OVERRIDE (IP): Performed by: SURGERY

## 2019-09-03 PROCEDURE — 700112 HCHG RX REV CODE 229: Performed by: SURGERY

## 2019-09-03 RX ORDER — METRONIDAZOLE 500 MG/1
500 TABLET ORAL EVERY 8 HOURS
Status: DISCONTINUED | OUTPATIENT
Start: 2019-09-03 | End: 2019-09-03 | Stop reason: HOSPADM

## 2019-09-03 RX ORDER — CIPROFLOXACIN 750 MG/1
750 TABLET, FILM COATED ORAL EVERY 12 HOURS
Qty: 10 TAB | Refills: 0 | Status: SHIPPED | OUTPATIENT
Start: 2019-09-03 | End: 2019-09-08

## 2019-09-03 RX ORDER — METRONIDAZOLE 500 MG/1
500 TABLET ORAL EVERY 8 HOURS
Qty: 15 TAB | Refills: 0 | Status: SHIPPED | OUTPATIENT
Start: 2019-09-03 | End: 2019-09-08

## 2019-09-03 RX ORDER — ACETAMINOPHEN 325 MG/1
650 TABLET ORAL EVERY 6 HOURS
Qty: 12 TAB | Refills: 0 | Status: SHIPPED | OUTPATIENT
Start: 2019-09-03 | End: 2019-09-06

## 2019-09-03 RX ORDER — CIPROFLOXACIN 750 MG/1
750 TABLET, FILM COATED ORAL EVERY 12 HOURS
Status: DISCONTINUED | OUTPATIENT
Start: 2019-09-03 | End: 2019-09-03 | Stop reason: HOSPADM

## 2019-09-03 RX ADMIN — DOCUSATE SODIUM 100 MG: 100 CAPSULE, LIQUID FILLED ORAL at 05:27

## 2019-09-03 RX ADMIN — FAMOTIDINE 20 MG: 20 TABLET ORAL at 05:27

## 2019-09-03 RX ADMIN — SODIUM CHLORIDE, POTASSIUM CHLORIDE, SODIUM LACTATE AND CALCIUM CHLORIDE: 600; 310; 30; 20 INJECTION, SOLUTION INTRAVENOUS at 05:25

## 2019-09-03 RX ADMIN — PIPERACILLIN AND TAZOBACTAM 4.5 G: 4; .5 INJECTION, POWDER, LYOPHILIZED, FOR SOLUTION INTRAVENOUS; PARENTERAL at 05:25

## 2019-09-03 RX ADMIN — ACETAMINOPHEN 650 MG: 325 TABLET, FILM COATED ORAL at 05:27

## 2019-09-03 RX ADMIN — ENOXAPARIN SODIUM 40 MG: 100 INJECTION SUBCUTANEOUS at 05:28

## 2019-09-03 ASSESSMENT — ENCOUNTER SYMPTOMS
ABDOMINAL PAIN: 1
FEVER: 0
CHILLS: 0
SHORTNESS OF BREATH: 0
NAUSEA: 0
VOMITING: 0

## 2019-09-03 NOTE — PROGRESS NOTES
KAREN drain removed per order. Suture kept in place per Dr. Nayak. Discharge instructions discussed and signed. All questions answered. Prescriptions with wife and follow up information provided. Patient verbalized worsening symptoms and when to return to ER or call MD. Patient opted to ambulate out with wife.

## 2019-09-03 NOTE — PROGRESS NOTES
Trauma / Surgical Daily Progress Note    Date of Service  9/3/2019    Chief Complaint  58 y.o. male admitted 9/1/2019 with Cholecystitis, acute    Interval Events    PO day # 2 laparoscopic cholecystectomy.   No leukocytosis, Afebrile   No peritoneal signs   Tolerating oral diet, (+) BM   Antibiotic therapy, yes     - DC drain   - Discharge   - Counseled     Review of Systems  Review of Systems   Constitutional: Negative for chills and fever.   Respiratory: Negative for shortness of breath.    Cardiovascular: Negative for chest pain.   Gastrointestinal: Positive for abdominal pain (Incisional). Negative for nausea and vomiting.        (+) BM        Vital Signs  Temp:  [36.6 °C (97.8 °F)-36.9 °C (98.5 °F)] 36.7 °C (98 °F)  Pulse:  [78-93] 90  Resp:  [16-18] 16  BP: (124-154)/(73-85) 133/85  SpO2:  [90 %-98 %] 95 %    Physical Exam  Physical Exam   Constitutional: No distress.   HENT:   Head: Normocephalic.   Eyes: Conjunctivae are normal.   Neck: No JVD present.   Cardiovascular: Normal rate.   Pulmonary/Chest: He exhibits no tenderness.   Abdominal: There is no rebound and no guarding.   Obese  Lap sites approximated    Musculoskeletal: Normal range of motion.   Neurological: He is alert.   Skin: Skin is warm and dry.   Nursing note and vitals reviewed.      Laboratory  Recent Results (from the past 24 hour(s))   CBC WITH DIFFERENTIAL    Collection Time: 09/03/19  3:09 AM   Result Value Ref Range    WBC 7.3 4.8 - 10.8 K/uL    RBC 4.07 (L) 4.70 - 6.10 M/uL    Hemoglobin 13.5 (L) 14.0 - 18.0 g/dL    Hematocrit 41.1 (L) 42.0 - 52.0 %    .0 (H) 81.4 - 97.8 fL    MCH 33.2 (H) 27.0 - 33.0 pg    MCHC 32.8 (L) 33.7 - 35.3 g/dL    RDW 47.8 35.9 - 50.0 fL    Platelet Count 104 (L) 164 - 446 K/uL    MPV 10.9 9.0 - 12.9 fL    Neutrophils-Polys 67.60 44.00 - 72.00 %    Lymphocytes 17.60 (L) 22.00 - 41.00 %    Monocytes 13.10 0.00 - 13.40 %    Eosinophils 1.10 0.00 - 6.90 %    Basophils 0.30 0.00 - 1.80 %    Immature  Granulocytes 0.30 0.00 - 0.90 %    Nucleated RBC 0.00 /100 WBC    Neutrophils (Absolute) 4.96 1.82 - 7.42 K/uL    Lymphs (Absolute) 1.29 1.00 - 4.80 K/uL    Monos (Absolute) 0.96 (H) 0.00 - 0.85 K/uL    Eos (Absolute) 0.08 0.00 - 0.51 K/uL    Baso (Absolute) 0.02 0.00 - 0.12 K/uL    Immature Granulocytes (abs) 0.02 0.00 - 0.11 K/uL    NRBC (Absolute) 0.00 K/uL   Basic Metabolic Panel    Collection Time: 09/03/19  3:09 AM   Result Value Ref Range    Sodium 141 135 - 145 mmol/L    Potassium 4.3 3.6 - 5.5 mmol/L    Chloride 107 96 - 112 mmol/L    Co2 29 20 - 33 mmol/L    Glucose 107 (H) 65 - 99 mg/dL    Bun 11 8 - 22 mg/dL    Creatinine 1.18 0.50 - 1.40 mg/dL    Calcium 8.3 (L) 8.5 - 10.5 mg/dL    Anion Gap 5.0 0.0 - 11.9   ESTIMATED GFR    Collection Time: 09/03/19  3:09 AM   Result Value Ref Range    GFR If African American >60 >60 mL/min/1.73 m 2    GFR If Non African American >60 >60 mL/min/1.73 m 2   Histology Request    Collection Time: 09/03/19  7:48 AM   Result Value Ref Range    Pathology Request Sent to Histo        Fluids    Intake/Output Summary (Last 24 hours) at 9/3/2019 0931  Last data filed at 9/3/2019 0800  Gross per 24 hour   Intake 3240 ml   Output 1133 ml   Net 2107 ml       Core Measures & Quality Metrics    Topete catheter: No Topete      DVT Prophylaxis: Enoxaparin (Lovenox)    Ulcer prophylaxis: Yes  Antibiotics: Treating active infection/contamination beyond 24 hours perioperative coverage  Assessed for rehab: Patient returned to prior level of function, rehabilitation not indicated at this time    Total Score: 0    ETOH Screening    Assessment/Plan  Acute cholecystitis  Assessment & Plan  CT imaging: possible acute or subacute cholecystitis.  9/1 Laparoscopic cholecystectomy.         Discussed patient condition with Patient and general surgery, Dr. Jeremiah Nayak.

## 2019-09-03 NOTE — DISCHARGE INSTRUCTIONS
Discharge Instructions    Discharged to home by car with relative. Discharged via walking, hospital escort: Refused.  Special equipment needed: Not Applicable    Be sure to schedule a follow-up appointment with your primary care doctor or any specialists as instructed.     Discharge Plan:   Diet Plan: Discussed  Activity Level: Discussed  Confirmed Follow up Appointment: Patient to Call and Schedule Appointment  Confirmed Symptoms Management: Discussed  Medication Reconciliation Updated: Yes  Influenza Vaccine Indication: Patient Refuses    I understand that a diet low in cholesterol, fat, and sodium is recommended for good health. Unless I have been given specific instructions below for another diet, I accept this instruction as my diet prescription.   Other diet: Regular    Special Instructions: None    · Is patient discharged on Warfarin / Coumadin?   No     Depression / Suicide Risk    As you are discharged from this [facility] facility, it is important to learn how to keep safe from harming yourself.    Recognize the warning signs:  · Abrupt changes in personality, positive or negative- including increase in energy   · Giving away possessions  · Change in eating patterns- significant weight changes-  positive or negative  · Change in sleeping patterns- unable to sleep or sleeping all the time   · Unwillingness or inability to communicate  · Depression  · Unusual sadness, discouragement and loneliness  · Talk of wanting to die  · Neglect of personal appearance   · Rebelliousness- reckless behavior  · Withdrawal from people/activities they love  · Confusion- inability to concentrate     If you or a loved one observes any of these behaviors or has concerns about self-harm, here's what you can do:  · Talk about it- your feelings and reasons for harming yourself  · Remove any means that you might use to hurt yourself (examples: pills, rope, extension cords, firearm)  · Get professional help from the community  (Mental Health, Substance Abuse, psychological counseling)  · Do not be alone:Call your Safe Contact- someone whom you trust who will be there for you.  · Call your local CRISIS HOTLINE 897-8322 or 314-697-1690  · Call your local Children's Mobile Crisis Response Team [place] (013) 089-1295 or www.[employer]  · Call the toll free National Suicide Prevention Hotlines   · National Suicide Prevention Lifeline 041-188-WBEC (5340)  · National Hope Line Network 800-SUICIDE (445-6021)      Laparoscopic Cholecystectomy, Care After  Refer to this sheet in the next few weeks. These instructions provide you with information on caring for yourself after your procedure. Your health care provider may also give you more specific instructions. Your treatment has been planned according to current medical practices, but problems sometimes occur. Call your health care provider if you have any problems or questions after your procedure.  WHAT TO EXPECT AFTER THE PROCEDURE  After your procedure, it is typical to have the following:  · Pain at your incision sites. You will be given pain medicines to control the pain.  · Mild nausea or vomiting. This should improve after the first 24 hours.  · Bloating and possibly shoulder pain from the gas used during the procedure. This will improve after the first 24 hours.  HOME CARE INSTRUCTIONS   · Change bandages (dressings) as directed by your health care provider.  · Keep the wound dry and clean. You may wash the wound gently with soap and water. Gently blot or dab the area dry.  · Do not take baths or use swimming pools or hot tubs for 2 weeks or until your health care provider approves.  · Only take over-the-counter or prescription medicines as directed by your health care provider.  · Continue your normal diet as directed by your health care provider.  · Do not lift anything heavier than 10 pounds (4.5 kg) until your health care provider approves.  · Do not play contact sports for  1 week or until your health care provider approves.  SEEK MEDICAL CARE IF:   · You have redness, swelling, or increasing pain in the wound.  · You notice yellowish-white fluid (pus) coming from the wound.  · You have drainage from the wound that lasts longer than 1 day.  · You notice a bad smell coming from the wound or dressing.  · Your surgical cuts (incisions) break open.  SEEK IMMEDIATE MEDICAL CARE IF:   · You develop a rash.  · You have difficulty breathing.  · You have chest pain.  · You have a fever.  · You have increasing pain in the shoulders (shoulder strap areas).  · You have dizzy episodes or faint while standing.  · You have severe abdominal pain.  · You feel sick to your stomach (nauseous) or throw up (vomit) and this lasts for more than 1 day.     This information is not intended to replace advice given to you by your health care provider. Make sure you discuss any questions you have with your health care provider.     Document Released: 12/18/2006 Document Revised: 10/08/2014 Document Reviewed: 07/30/2014  [employer] Interactive Patient Education ©2016 [employer]

## 2019-09-03 NOTE — CARE PLAN
Problem: Hyperinflation:  Goal: Prevent or improve atelectasis  Outcome: PROGRESSING AS EXPECTED       IS BID  Actual [id number] mLs

## 2019-09-03 NOTE — CARE PLAN
Problem: Pain Management  Goal: Pain level will decrease to patient's comfort goal  Outcome: PROGRESSING AS EXPECTED  Patient states pain is minimal when in the bed and moderated when up moving.      Problem: Infection  Goal: Will remain free from infection  Outcome: PROGRESSING AS EXPECTED  Patient is on antibiotics and labs are being monitored.     Problem: Fluid Volume:  Goal: Will maintain balanced intake and output  Outcome: PROGRESSING AS EXPECTED  Patient is on IV fluids and oral intake in encouraged. Patient is using the toilet and voiding into hat for measurement.

## 2019-09-04 NOTE — DISCHARGE SUMMARY
DATE OF ADMISSION:  09/01/2019    DATE OF DISCHARGE:  09/03/2019    ATTENDING PHYSICIAN:  Jeremiah Nayak, DO    DISCHARGE DIAGNOSIS:  Acute cholecystitis.    PROCEDURES:  Date of procedure 09/01/2019, procedure performed by Dr. Jeremiah Nayak, laparoscopic cholecystectomy.    HISTORY OF PRESENT ILLNESS:  The patient is a 58-year-old male who presented   to the emergency department on the day of admission for evaluation of severe   abdominal pain and nausea and vomiting.  Review of the records indicates that   he has been having pain for about 2 weeks and the pain had been coming and   going, but on the day of admission, he woke up with severe pain associated   with nausea and vomiting, and it did not go away.    HOSPITAL COURSE:  Workup and imaging were consistent with acute or subacute   cholecystitis and the patient presented to the operating theater on 09/01/2019   for a laparoscopic cholecystectomy.    Postoperatively, the patient did well.  He received IV antibiotic therapy.    His drain output was trended.  On the day of discharge, the patient was   afebrile, nontoxic in appearance.  His white blood cell count was 7.3.  He was   tolerating an oral diet.  His bowel and bladder function returned to normal.    His drain was removed and his incisions were well approximated.  Abdominal   exam revealed no peritoneal signs and his incisions were well approximated.    DISCHARGE PHYSICAL EXAM:  Please see exam dated 09/03/2019.    DISCHARGE MEDICATIONS:  1.  Tylenol 325 mg tablet, take 2 tablets by mouth every 6 hours for up to 3   days, dispensed 12 tablets, refills 0.  2.  Ciprofloxacin 750 mg tablet, take 1 tablet by mouth every 12 hours for 5   days, dispensed 10 tablets, refills 0.  3.  Flagyl 500 mg tablet, take 1 tablet by mouth every 8 hours for 5 days,   dispensed 15 tablets, refills 0.    DISPOSITION:  The patient will be discharged home in good condition on   09/03/2019.  He will follow with Dr. Daniels  Nael within 1 week's time as   needed or symptoms worsen.  The patient and his wife have been extensively   counseled.  All their questions have been answered.  Special attention was   paid to the signs and symptoms of infection, new or worsening abdominal pain   or return of prehospital symptoms and to seek immediate medical attention if   these do develop.  They demonstrate understanding of discharge instructions   and gave verbal compliance.    TIME SPENT ON DISCHARGE:  Thirty-five minutes.       ____________________________________     LEANNE GOSS / HADLEY    DD:  09/03/2019 13:08:48  DT:  09/03/2019 17:31:04    D#:  [id number]  Job#:  [id number]

## (undated) DEVICE — WATER IRRIG. STER. 1500 ML - (9/CA)

## (undated) DEVICE — DRAPESURG STERI-DRAPE LONG - (10/BX 4BX/CA)

## (undated) DEVICE — DRESSING TRANSPARENT FILM TEGADERM 4 X 4.75" (50EA/BX)"

## (undated) DEVICE — MASK ANESTHESIA ADULT  - (100/CA)

## (undated) DEVICE — DETERGENT RENUZYME PLUS 10 OZ PACKET (50/BX)

## (undated) DEVICE — SET EXTENSION WITH 2 PORTS (48EA/CA) ***PART #2C8610 IS A SUBSTITUTE*****

## (undated) DEVICE — SODIUM CHL IRRIGATION 0.9% 1000ML (12EA/CA)

## (undated) DEVICE — TUBE CONNECT SUCTION CLEAR 120 X 1/4" (50EA/CA)"

## (undated) DEVICE — TROCAR 5X100 NON BLADED Z-TH - READ KII (6/BX)

## (undated) DEVICE — LACTATED RINGERS INJ 1000 ML - (14EA/CA 60CA/PF)

## (undated) DEVICE — SPONGE GAUZESTER. 2X2 4-PL - (2/PK 50PK/BX 30BX/CS)

## (undated) DEVICE — PROTECTOR ULNA NERVE - (36PR/CA)

## (undated) DEVICE — STERI STRIP COMPOUND BENZOIN - TINCTURE 0.6ML WITH APPLICATOR (40EA/BX)

## (undated) DEVICE — PACK LAP CHOLE OR - (2EA/CA)

## (undated) DEVICE — SET SUCTION/IRRIGATION WITH DISPOSABLE TIP (6/CA )PART #0250-070-520 IS A SUB

## (undated) DEVICE — TROCAR LAPSCP 100MM 12MM NTHRD - (6/BX)

## (undated) DEVICE — SET LEADWIRE 5 LEAD BEDSIDE DISPOSABLE ECG (1SET OF 5/EA)

## (undated) DEVICE — SENSOR SPO2 NEO LNCS ADHESIVE (20/BX) SEE USER NOTES

## (undated) DEVICE — ELECTRODE 850 FOAM ADHESIVE - HYDROGEL RADIOTRNSPRNT (50/PK)

## (undated) DEVICE — RESERVOIR SUCTION 100 CC - SILICONE (20EA/CA)

## (undated) DEVICE — TROCAR Z THREAD11MM OPTICAL - NON BLADED(6/BX)

## (undated) DEVICE — CLIP MED LG INTNL HRZN TI ESCP - (20/BX)

## (undated) DEVICE — KIT ANESTHESIA W/CIRCUIT & 3/LT BAG W/FILTER (20EA/CA)

## (undated) DEVICE — BAG RETRIEVAL 10ML (10EA/BX)

## (undated) DEVICE — ELECTRODE DUAL RETURN W/ CORD - (50/PK)

## (undated) DEVICE — CANISTER SUCTION 3000ML MECHANICAL FILTER AUTO SHUTOFF MEDI-VAC NONSTERILE LF DISP  (40EA/CA)

## (undated) DEVICE — DRESSING TRANSPARENT FILM TEGADERM 2.375 X 2.75"  (100EA/BX)"

## (undated) DEVICE — CHLORAPREP 26 ML APPLICATOR - ORANGE TINT(25/CA)

## (undated) DEVICE — HEMOSTAT SURG ABSORBABLE - 4 X 8 IN SURGICEL (24EA/CA)

## (undated) DEVICE — SPONGE GAUZE STER 4X4 8-PL - (2/PK 50PK/BX 12BX/CS)

## (undated) DEVICE — KIT ROOM DECONTAMINATION

## (undated) DEVICE — NEPTUNE 4 PORT MANIFOLD - (20/PK)

## (undated) DEVICE — SUCTION INSTRUMENT YANKAUER BULBOUS TIP W/O VENT (50EA/CA)

## (undated) DEVICE — CLOSURE SKIN STRIP 1/2 X 4 IN - (STERI STRIP) (50/BX 4BX/CA)

## (undated) DEVICE — GLOVE BIOGEL PI ORTHO SZ 8 PF LF (40PR/BX)

## (undated) DEVICE — TUBING CLEARLINK DUO-VENT - C-FLO (48EA/CA)

## (undated) DEVICE — SUTURE GENERAL

## (undated) DEVICE — HEAD HOLDER JUNIOR/ADULT

## (undated) DEVICE — CANNULA W/SEAL 5X100 Z-THRE - ADED KII (12/BX)

## (undated) DEVICE — GLOVE BIOGEL SZ 8 SURGICAL PF LTX - (50PR/BX 4BX/CA)

## (undated) DEVICE — APPLICATOR SURGIFLO - (6EA/CA)